# Patient Record
Sex: FEMALE | Race: WHITE | NOT HISPANIC OR LATINO | Employment: PART TIME | URBAN - METROPOLITAN AREA
[De-identification: names, ages, dates, MRNs, and addresses within clinical notes are randomized per-mention and may not be internally consistent; named-entity substitution may affect disease eponyms.]

---

## 2021-12-15 ENCOUNTER — OFFICE VISIT (OUTPATIENT)
Dept: VASCULAR SURGERY | Facility: CLINIC | Age: 41
End: 2021-12-15
Payer: COMMERCIAL

## 2021-12-15 DIAGNOSIS — I78.1 SPIDER TELANGIECTASIS OF SKIN: Primary | ICD-10-CM

## 2021-12-15 PROCEDURE — 99202 OFFICE O/P NEW SF 15 MIN: CPT | Performed by: SURGERY

## 2021-12-15 RX ORDER — TRAZODONE HYDROCHLORIDE 50 MG/1
50-100 TABLET, FILM COATED ORAL
COMMUNITY
Start: 2021-03-11

## 2021-12-15 NOTE — PROGRESS NOTES
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  She does admit to some pruritus in the area of the telangiectasias on the lateral thighs but no pain and no varicose veins.    She was evaluated at an outside clinic about 10 years ago for the telangiectasias and was told she needed venous competency studies which led to bilateral great saphenous vein ablations.  This was followed by sclerotherapy.    She has a history of deep vein thrombosis, superficial thrombophlebitis or hemorrhage.    Her family history is significant for telangiectasias in her mother.  ALLERGIES:   Allergies as of 12/08/2021 - Reviewed 03/11/2021   Allergen Reaction Noted     Gluten *Unknown 06/04/2019     MEDICATIONS:   Current Outpatient Medications   Medication Sig Dispense Refill     magnesium citrate 125 mg cap Take 1 capsule by mouth 2 times daily. 0     medication order composer Patient taking immune supplement. Take 3 tablets by mouth daily 0     traZODone (DESYREL) 50 mg tablet Take 1-2 Tablets by mouth at bedtime. 60 Tablet 3     No current facility-administered medications for this visit.     Medications have been reviewed by me and are current to the best of my knowledge and ability.    PAST MEDICAL HISTORY:   Past Medical History:   . Date     H/O anorexia nervosa   eating disorder     H/O echocardiogram 06/2019   Normal left ventricular size, normal wall thickness, normal global systolic function, calculated EF of 61 %. due to mother's history and palpatations     History of EKG 06/2019   WNL due to mother's history and palpations     Tibial fracture 2020   no surgery   ,   Past Surgical History:   . Laterality Date     DC UNLISTED PROCEDURE VESTIBULE MOUTH     SOCIAL HISTORY:   Social History     Tobacco Use     Smoking status: Never Smoker     Smokeless  tobacco: Never Used   Substance Use Topics     Alcohol use: Yes     FAMILY HISTORY:   Family History   Problem Relation Age of Onset     Hashimoto's thyroiditis Mother     Other Mother   cardiac MR, MVP     Cancer-breast Mother   DCIS     Good Health Father     Hashimoto's thyroiditis Sister     Anxiety disorder Sister     Hypertension Maternal Grandmother    age 93     Hyperlipidemia Maternal Grandmother     Alzheimer's disease Maternal Grandfather   POW (beriberi, malaria)     Hypertension Paternal Grandmother     Hyperlipidemia Paternal Grandmother     Stroke Paternal Grandmother 70   several,  in 80s     Heart attack Paternal Grandfather 70     Cancer-colon No Family History     Cancer-ovarian No Family History     Not significant for Breast, Ovarian, Uterine, Cervical and Colon cancer, DVT/PE.     SOCIAL HISTORY:   Social History     Tobacco Use     Smoking status: Never Smoker     Smokeless tobacco: Never Used   Substance Use Topics     Alcohol use: Not on file       REVIEW OF SYSTEMS: Review Of Systems  Skin: itching  Eyes: negative  Ears/Nose/Throat: negative  Respiratory: No shortness of breath, dyspnea on exertion, cough, or hemoptysis  Cardiovascular: negative  Gastrointestinal: diarrhea  Genitourinary: negative  Musculoskeletal: back pain  Neurologic: negative  Psychiatric: anxiety  Hematologic/Lymphatic/Immunologic: negative  Endocrine: negative      Vital signs:  There were no vitals taken for this visit.    Current Outpatient Medications   Medication Sig Dispense Refill     Magnesium Gluconate (MAGNESIUM 27 PO)        traZODone (DESYREL) 50 MG tablet Take  mg by mouth       levonorgestrel (MIRENA, 52 MG,) 20 MCG/24HR IUD 1 Device by Intrauterine route         PHYSICAL EXAM:  General: Pleasant, NAD.   HEENT: Normocephalic, atraumatic, external ears and nose normal.   Respiratory: Normal respiratory effort.   Cardiovascular: Pulse is regular.   Musculoskeletal: Gait and station normal.   The joints of her fingers and toes without deformity.  There is no cyanosis of her nailbeds.   EXTREMITIES: Scattered telangiectasias over the anterolateral thighs bilaterally.  With a few scattered on the left legs.  No varicose veins edema or stasis changes..    PULSES: R/L (3=normal pulse, 0=no palpable pulse) dorsalis pedis: 3/3 posterior tibial: 3/3.      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  Discussed details and risks of cosmetic sclerotherapy using polidocanol foam which include allergic reaction, deep antibiosis, superficial thrombophlebitis, ulceration.  She voiced understanding and her questions answered.  She understands fully that this will not become anxious will be her responsibility.  She understands and multiple sessions may be necessary.    PLAN:  Bilateral extremity cosmetic sclerotherapy     Burt Levin MD    Dictated using Dragon voice recognition software which may result in transcription errors        VEIN CLINIC LEG DRAWING:

## 2021-12-15 NOTE — LETTER
12/15/2021         RE: Namita Mendiola  [address] [facility] [address]        Dear Colleague,    Thank you for referring your patient, Namita Mendiola, to the [facility] VEIN [facility] [place]. Please see a copy of my visit note below.    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  She does admit to some pruritus in the area of the telangiectasias on the lateral thighs but no pain and no varicose veins.    She was evaluated at an outside clinic about 10 years ago for the telangiectasias and was told she needed venous competency studies which led to bilateral great saphenous vein ablations.  This was followed by sclerotherapy.    She has a history of deep vein thrombosis, superficial thrombophlebitis or hemorrhage.    Her family history is significant for telangiectasias in her mother.  ALLERGIES:   Allergies as of 12/08/2021 - Reviewed 03/11/2021   Allergen Reaction Noted     Gluten *Unknown 06/04/2019     MEDICATIONS:   Current Outpatient Medications   Medication Sig Dispense Refill     magnesium citrate 125 mg cap Take 1 capsule by mouth 2 times daily. 0     medication order composer Patient taking immune supplement. Take 3 tablets by mouth daily 0     traZODone (DESYREL) 50 mg tablet Take 1-2 Tablets by mouth at bedtime. 60 Tablet 3     No current facility-administered medications for this visit.     Medications have been reviewed by me and are current to the best of my knowledge and ability.    PAST MEDICAL HISTORY:   Past Medical History:   . Date     H/O anorexia nervosa   eating disorder     H/O echocardiogram 06/2019   Normal left ventricular size, normal wall thickness, normal global systolic function, calculated EF of 61 %. due to mother's history and palpatations     History of EKG 06/2019   WNL due  to mother's history and palpations     Tibial fracture    no surgery   ,   Past Surgical History:   . Laterality Date     MA UNLISTED PROCEDURE VESTIBULE MOUTH     SOCIAL HISTORY:   Social History     Tobacco Use     Smoking status: Never Smoker     Smokeless tobacco: Never Used   Substance Use Topics     Alcohol use: Yes     FAMILY HISTORY:   Family History   Problem Relation Age of Onset     Hashimoto's thyroiditis Mother     Other Mother   cardiac MR, MVP     Cancer-breast Mother   DCIS     Good Health Father     Hashimoto's thyroiditis Sister     Anxiety disorder Sister     Hypertension Maternal Grandmother    age 93     Hyperlipidemia Maternal Grandmother     Alzheimer's disease Maternal Grandfather   POW (beriberi, malaria)     Hypertension Paternal Grandmother     Hyperlipidemia Paternal Grandmother     Stroke Paternal Grandmother 70   several,  in 80s     Heart attack Paternal Grandfather 70     Cancer-colon No Family History     Cancer-ovarian No Family History     Not significant for Breast, Ovarian, Uterine, Cervical and Colon cancer, DVT/PE.     SOCIAL HISTORY:   Social History     Tobacco Use     Smoking status: Never Smoker     Smokeless tobacco: Never Used   Substance Use Topics     Alcohol use: Not on file       REVIEW OF SYSTEMS: Review Of Systems  Skin: itching  Eyes: negative  Ears/Nose/Throat: negative  Respiratory: No shortness of breath, dyspnea on exertion, cough, or hemoptysis  Cardiovascular: negative  Gastrointestinal: diarrhea  Genitourinary: negative  Musculoskeletal: back pain  Neurologic: negative  Psychiatric: anxiety  Hematologic/Lymphatic/Immunologic: negative  Endocrine: negative      Vital signs:  There were no vitals taken for this visit.    Current Outpatient Medications   Medication Sig Dispense Refill     Magnesium Gluconate (MAGNESIUM 27 PO)        traZODone (DESYREL) 50 MG tablet Take  mg by mouth       levonorgestrel (MIRENA, 52 MG,) 20 MCG/24HR IUD 1  Device by Intrauterine route         PHYSICAL EXAM:  General: Pleasant, NAD.   HEENT: Normocephalic, atraumatic, external ears and nose normal.   Respiratory: Normal respiratory effort.   Cardiovascular: Pulse is regular.   Musculoskeletal: Gait and station normal.  The joints of her fingers and toes without deformity.  There is no cyanosis of her nailbeds.   EXTREMITIES: Scattered telangiectasias over the anterolateral thighs bilaterally.  With a few scattered on the left legs.  No varicose veins edema or stasis changes..    PULSES: R/L (3=normal pulse, 0=no palpable pulse) dorsalis pedis: 3/3 posterior tibial: 3/3.      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  Discussed details and risks of cosmetic sclerotherapy using polidocanol foam which include allergic reaction, deep antibiosis, superficial thrombophlebitis, ulceration.  She voiced understanding and her questions answered.  She understands fully that this will not become anxious will be her responsibility.  She understands and multiple sessions may be necessary.    PLAN:  Bilateral extremity cosmetic sclerotherapy     Burt Levin MD    Dictated using Dragon voice recognition software which may result in transcription errors        VEIN CLINIC LEG DRAWING:                  Again, thank you for allowing me to participate in the care of your patient.        Sincerely,        Burt Levin MD

## 2022-02-01 ENCOUNTER — OFFICE VISIT (OUTPATIENT)
Dept: VASCULAR SURGERY | Facility: CLINIC | Age: 42
End: 2022-02-01

## 2022-02-01 DIAGNOSIS — I78.1 SPIDER TELANGIECTASIS OF SKIN: Primary | ICD-10-CM

## 2022-02-01 PROCEDURE — 36468 NJX SCLRSNT SPIDER VEINS: CPT | Performed by: SURGERY

## 2022-02-01 PROCEDURE — S9999 SALES TAX: HCPCS | Performed by: SURGERY

## 2022-02-01 NOTE — LETTER
2/1/2022         RE: Namita Mendiola  [address] [facility] [address]        Dear Colleague,    Thank you for referring your patient, Namita Mendiola, to the [facility] [place]. Please see a copy of my visit note below.        Vein Clinic Sclerotherapy Note     Indications:  Bilateral lower extremity spider telangiectasias of cosmetic concern     Procedure:  Bilateral lower extremity cosmetic sclerotherapy     Procedure description  Details of procedure including risks of allergic reaction, deep vein thrombosis, ulceration, superficial thrombophlebitis and hyperpigmentation were discussed.  The patient voiced understanding and wished to proceed.  Informed consent was obtained.    I donned the Syris headlight and injected telangiectasias and reticular veins over the proximal lateral thighs extending over the anterior legs bilaterally.  There were no significant spider veins over the ankles.    I then had the patient turned prone and injected a few telangiectasias over the distal posterior thighs and popliteal fossas.  We used a total of 9 mL of foam.  She tolerated this well.    I had her perform ankle pumps.  We cleaned her legs, had her don thigh-high compression, then had her walk for 10 minutes.  She will return in 6 weeks in follow-up.    She tolerated the procedure well without evidence of allergic reaction or other complications.    Sclerotherapy    Date/Time: 2/1/2022 5:10 PM  Performed by: Burt Levin MD  Authorized by: Burt Levin MD     Time out: Immediately prior to the procedure a time out was called    Type:  Cosmetic  Session:  Full  Procedure side:  Bilateral  Solution/Amount:  .5 POLIDOCANOL  Syringes:  3  Patient tolerance:  Patient tolerated the procedure well with no immediate complications  Wrap/Hose:  Corey Levin MD    Dictated using Dragon voice recognition software which may result in transcription  errors      Again, thank you for allowing me to participate in the care of your patient.        Sincerely,        Burt Levin MD

## 2022-02-01 NOTE — PROGRESS NOTES
Vein Clinic Sclerotherapy Note     Indications:  Bilateral lower extremity spider telangiectasias of cosmetic concern     Procedure:  Bilateral lower extremity cosmetic sclerotherapy     Procedure description  Details of procedure including risks of allergic reaction, deep vein thrombosis, ulceration, superficial thrombophlebitis and hyperpigmentation were discussed.  The patient voiced understanding and wished to proceed.  Informed consent was obtained.    I donned the Syris headlight and injected telangiectasias and reticular veins over the proximal lateral thighs extending over the anterior legs bilaterally.  There were no significant spider veins over the ankles.    I then had the patient turned prone and injected a few telangiectasias over the distal posterior thighs and popliteal fossas.  We used a total of 9 mL of foam.  She tolerated this well.    I had her perform ankle pumps.  We cleaned her legs, had her don thigh-high compression, then had her walk for 10 minutes.  She will return in 6 weeks in follow-up.    She tolerated the procedure well without evidence of allergic reaction or other complications.    Sclerotherapy    Date/Time: 2/1/2022 5:10 PM  Performed by: Burt Levin MD  Authorized by: Burt Levin MD     Time out: Immediately prior to the procedure a time out was called    Type:  Cosmetic  Session:  Full  Procedure side:  Bilateral  Solution/Amount:  .5 POLIDOCANOL  Syringes:  3  Patient tolerance:  Patient tolerated the procedure well with no immediate complications  Wrap/Hose:  Corey Levin MD    Dictated using Dragon voice recognition software which may result in transcription errors

## 2022-02-02 ENCOUNTER — TELEPHONE (OUTPATIENT)
Dept: VASCULAR SURGERY | Facility: CLINIC | Age: 42
End: 2022-02-02
Payer: COMMERCIAL

## 2022-02-02 NOTE — TELEPHONE ENCOUNTER
Pt calling with questions if it is ok for pt to get blood drawn today. Pt was seen for sclero 2/1/22.

## 2022-02-02 NOTE — TELEPHONE ENCOUNTER
Spoke with patient and answered questions in regards to donating blood today with [facility]. No further questions at this time.

## 2022-04-13 ENCOUNTER — OFFICE VISIT (OUTPATIENT)
Dept: VASCULAR SURGERY | Facility: CLINIC | Age: 42
End: 2022-04-13

## 2022-04-13 DIAGNOSIS — I78.1 SPIDER TELANGIECTASIS OF SKIN: Primary | ICD-10-CM

## 2022-04-13 PROCEDURE — S9999 SALES TAX: HCPCS | Performed by: SURGERY

## 2022-04-13 PROCEDURE — 36468 NJX SCLRSNT SPIDER VEINS: CPT | Performed by: SURGERY

## 2022-04-13 NOTE — PROGRESS NOTES
Vein Clinic Sclerotherapy Note     Indications:  Residual bilateral lower extremity spider telangiectasias of cosmetic concern; status post 1 session of cosmetic sclerotherapy     Procedure:  Bilateral lower extremity cosmetic sclerotherapy     Procedure description  Details of procedure including risks of allergic reaction, deep vein thrombosis, ulceration, superficial thrombophlebitis and hyperpigmentation were discussed.  The patient voiced understanding and wished to proceed.  Informed consent was obtained.    I donned the Syris headlight and injected residual telangiectasias most noticeable over the lateral and posterior lateral thighs with a few scattered on her legs.  We injected some telangiectasias on the distal posterior thighs and in the popliteal fossas as well.  He is total of 2 syringes of foam (6 mm).    Have the patient pump her ankles.  We cleaned her legs, had her don thigh-high compression, then had her walk for 10 minutes before leaving the office.  She will return on an as-needed basis.    Sclerotherapy    Date/Time: 4/13/2022 4:49 PM  Performed by: Burt Levin MD  Authorized by: Burt Levin MD     Time out: Immediately prior to the procedure a time out was called    Type:  Cosmetic  Session:  Limited  Procedure side:  Bilateral  Solution/Amount:  .5 POLIDOCANOL  Syringes:  2  Patient tolerance:  Patient tolerated the procedure well with no immediate complications  Wrap/Hose:  Corey Levin MD    Dictated using Dragon voice recognition software which may result in transcription errors

## 2022-04-13 NOTE — LETTER
4/13/2022         RE: Namita Mendiola  [address] [facility] [address]        Dear Colleague,    Thank you for referring your patient, Namita Mendiola, to the [facility] VEIN CL[facility] [place]. Please see a copy of my visit note below.        Vein Clinic Sclerotherapy Note     Indications:  Residual bilateral lower extremity spider telangiectasias of cosmetic concern; status post 1 session of cosmetic sclerotherapy     Procedure:  Bilateral lower extremity cosmetic sclerotherapy     Procedure description  Details of procedure including risks of allergic reaction, deep vein thrombosis, ulceration, superficial thrombophlebitis and hyperpigmentation were discussed.  The patient voiced understanding and wished to proceed.  Informed consent was obtained.    I donned the Syris headlight and injected residual telangiectasias most noticeable over the lateral and posterior lateral thighs with a few scattered on her legs.  We injected some telangiectasias on the distal posterior thighs and in the popliteal fossas as well.  He is total of 2 syringes of foam (6 mm).    Have the patient pump her ankles.  We cleaned her legs, had her don thigh-high compression, then had her walk for 10 minutes before leaving the office.  She will return on an as-needed basis.    Sclerotherapy    Date/Time: 4/13/2022 4:49 PM  Performed by: uBrt Levin MD  Authorized by: Burt Levin MD     Time out: Immediately prior to the procedure a time out was called    Type:  Cosmetic  Session:  Limited  Procedure side:  Bilateral  Solution/Amount:  .5 POLIDOCANOL  Syringes:  2  Patient tolerance:  Patient tolerated the procedure well with no immediate complications  Wrap/Hose:  Corey Levin MD    Dictated using Dragon voice recognition software which may result in transcription errors      Again, thank you for allowing me to participate in the care of your patient.         Sincerely,        Burt Levin MD

## 2022-04-17 ENCOUNTER — HEALTH MAINTENANCE LETTER (OUTPATIENT)
Age: 42
End: 2022-04-17

## 2022-10-23 ENCOUNTER — HEALTH MAINTENANCE LETTER (OUTPATIENT)
Age: 42
End: 2022-10-23

## 2023-02-02 ENCOUNTER — OFFICE VISIT (OUTPATIENT)
Dept: VASCULAR SURGERY | Facility: CLINIC | Age: 43
End: 2023-02-02
Payer: COMMERCIAL

## 2023-02-02 DIAGNOSIS — I78.1 SPIDER TELANGIECTASIS OF SKIN: Primary | ICD-10-CM

## 2023-02-02 PROCEDURE — 36468 NJX SCLRSNT SPIDER VEINS: CPT | Performed by: SURGERY

## 2023-02-02 PROCEDURE — S9999 SALES TAX: HCPCS | Performed by: SURGERY

## 2023-02-02 NOTE — PROGRESS NOTES
Vein Clinic Sclerotherapy Note     Indications:  Right greater than left lower extremity spider telangiectasias of cosmetic concern     Procedure:  Bilateral lower extremity cosmetic sclerotherapy-limited session     Procedure description  Details of procedure including risks of allergic reaction, deep vein thrombosis, ulceration, superficial thrombophlebitis and hyperpigmentation were discussed.  The patient voiced understanding and wished to proceed.  Informed consent was obtained.    Patient was most concerned about telangiectasias on the mid pretibial area extending down to the right distal leg/ankle and on the mid posteromedial left calf.    I donned the Syris headlight and noted that the telangiectasias on the mid right leg were quite fine..  The other veins of concern to her were not as easily visible and likely would need to be injected under ultrasound guidance if she feels that this treatment does not provide adequate improvement.  We also injected the fine cluster on the mid posteromedial left calf.  I had the patient perform ankle pumps.    We cleaned her leg, had her don compression, then had her walk for 10 minutes before she drove home.  She will return either in 6 weeks or on an as-needed basis.    Sclerotherapy    Date/Time: 2/2/2023 2:29 PM  Performed by: Burt Levin MD  Authorized by: Burt Levin MD     Time out: Immediately prior to the procedure a time out was called    Type:  Cosmetic  Session:  Limited  Procedure side:  Bilateral  Solution/Amount:  .5 POLIDOCANOL  Syringes:   1 1/2 syringes 0.5% polidocanol foam  Patient tolerance:  Patient tolerated the procedure well with no immediate complications  Wrap/Hose:  Corey Levin MD    Dictated using Dragon voice recognition software which may result in transcription errors

## 2023-02-02 NOTE — LETTER
2/2/2023         RE: Namita Mendiola  [address] [facility] [address]        Dear Colleague,    Thank you for referring your patient, Namita Mendiola, to the [facility] VEIN CL[facility] [place]. Please see a copy of my visit note below.        Vein Clinic Sclerotherapy Note     Indications:  Right greater than left lower extremity spider telangiectasias of cosmetic concern     Procedure:  Bilateral lower extremity cosmetic sclerotherapy-limited session     Procedure description  Details of procedure including risks of allergic reaction, deep vein thrombosis, ulceration, superficial thrombophlebitis and hyperpigmentation were discussed.  The patient voiced understanding and wished to proceed.  Informed consent was obtained.    Patient was most concerned about telangiectasias on the mid pretibial area extending down to the right distal leg/ankle and on the mid posteromedial left calf.    I donned the Syris headlight and noted that the telangiectasias on the mid right leg were quite fine..  The other veins of concern to her were not as easily visible and likely would need to be injected under ultrasound guidance if she feels that this treatment does not provide adequate improvement.  We also injected the fine cluster on the mid posteromedial left calf.  I had the patient perform ankle pumps.    We cleaned her leg, had her don compression, then had her walk for 10 minutes before she drove home.  She will return either in 6 weeks or on an as-needed basis.    Sclerotherapy    Date/Time: 2/2/2023 2:29 PM  Performed by: Burt Levin MD  Authorized by: Burt Levin MD     Time out: Immediately prior to the procedure a time out was called    Type:  Cosmetic  Session:  Limited  Procedure side:  Bilateral  Solution/Amount:  .5 POLIDOCANOL  Syringes:   1 1/2 syringes 0.5% polidocanol foam  Patient tolerance:  Patient tolerated the procedure well with no immediate  complications  Wrap/Hose:  Hose        C Edvin Levin MD    Dictated using Dragon voice recognition software which may result in transcription errors      Again, thank you for allowing me to participate in the care of your patient.        Sincerely,        Burt Levin MD

## 2023-06-24 ENCOUNTER — HEALTH MAINTENANCE LETTER (OUTPATIENT)
Age: 43
End: 2023-06-24

## 2024-03-30 ENCOUNTER — HEALTH MAINTENANCE LETTER (OUTPATIENT)
Age: 44
End: 2024-03-30

## 2024-08-17 ENCOUNTER — HEALTH MAINTENANCE LETTER (OUTPATIENT)
Age: 44
End: 2024-08-17